# Patient Record
Sex: MALE
[De-identification: names, ages, dates, MRNs, and addresses within clinical notes are randomized per-mention and may not be internally consistent; named-entity substitution may affect disease eponyms.]

---

## 2023-08-28 PROBLEM — Z00.00 ENCOUNTER FOR PREVENTIVE HEALTH EXAMINATION: Status: ACTIVE | Noted: 2023-08-28

## 2023-09-01 ENCOUNTER — APPOINTMENT (OUTPATIENT)
Dept: NEUROSURGERY | Facility: CLINIC | Age: 73
End: 2023-09-01
Payer: MEDICARE

## 2023-09-01 VITALS — WEIGHT: 122 LBS | HEIGHT: 66.14 IN | BODY MASS INDEX: 19.61 KG/M2

## 2023-09-01 DIAGNOSIS — Z78.9 OTHER SPECIFIED HEALTH STATUS: ICD-10-CM

## 2023-09-01 PROCEDURE — 99204 OFFICE O/P NEW MOD 45 MIN: CPT

## 2023-09-01 RX ORDER — GABAPENTIN 300 MG/1
300 CAPSULE ORAL
Refills: 0 | Status: ACTIVE | COMMUNITY

## 2023-09-01 RX ORDER — ATORVASTATIN CALCIUM 10 MG/1
10 TABLET, FILM COATED ORAL
Refills: 0 | Status: ACTIVE | COMMUNITY

## 2023-09-01 NOTE — ASSESSMENT
[FreeTextEntry1] : On exam, negative SLRs bilaterally. Gait steady. Walk on heels and toes well. Motor and sensory all intact. ROM of the LB is very good without back pain. DTRs symmetric.   Reviewed MRI of the LS spine from July. Small herniated disc on the right at L4-5 with slight inferior migration.   Gave pt exercises instruction and activities level. Discussed with pt and his wife at length. Will see pt as needed.

## 2023-09-01 NOTE — HISTORY OF PRESENT ILLNESS
[de-identified] : 72 year old gentleman with onset of right LE pain and paresthesia in the L5 distribution about 2 months ago. Sudden onset. Underwent PT and acupuncture which actually made him feel worse although in the past few weeks he has noticed tremendous improvement to the point where he hardly notices any pain at all. It used to be most symptomatic when he sit. Now he can sit, stand walk and sleep well.